# Patient Record
Sex: FEMALE
[De-identification: names, ages, dates, MRNs, and addresses within clinical notes are randomized per-mention and may not be internally consistent; named-entity substitution may affect disease eponyms.]

---

## 2021-01-26 ENCOUNTER — APPOINTMENT (OUTPATIENT)
Dept: HUMAN REPRODUCTION | Facility: CLINIC | Age: 31
End: 2021-01-26

## 2021-02-25 ENCOUNTER — APPOINTMENT (OUTPATIENT)
Dept: HUMAN REPRODUCTION | Facility: CLINIC | Age: 31
End: 2021-02-25
Payer: SELF-PAY

## 2021-02-25 PROCEDURE — 99205 OFFICE O/P NEW HI 60 MIN: CPT | Mod: 95

## 2021-03-18 ENCOUNTER — APPOINTMENT (OUTPATIENT)
Dept: HUMAN REPRODUCTION | Facility: CLINIC | Age: 31
End: 2021-03-18

## 2021-04-20 PROBLEM — Z00.00 ENCOUNTER FOR PREVENTIVE HEALTH EXAMINATION: Status: ACTIVE | Noted: 2021-04-20

## 2021-04-21 ENCOUNTER — APPOINTMENT (OUTPATIENT)
Dept: MATERNAL FETAL MEDICINE | Facility: CLINIC | Age: 31
End: 2021-04-21

## 2021-04-21 ENCOUNTER — TRANSCRIPTION ENCOUNTER (OUTPATIENT)
Age: 31
End: 2021-04-21

## 2021-04-28 ENCOUNTER — APPOINTMENT (OUTPATIENT)
Dept: MATERNAL FETAL MEDICINE | Facility: CLINIC | Age: 31
End: 2021-04-28
Payer: MEDICAID

## 2021-04-28 VITALS — WEIGHT: 165 LBS | BODY MASS INDEX: 29.23 KG/M2 | HEIGHT: 63 IN

## 2021-04-28 DIAGNOSIS — F41.9 ANXIETY DISORDER, UNSPECIFIED: ICD-10-CM

## 2021-04-28 DIAGNOSIS — Z56.0 UNEMPLOYMENT, UNSPECIFIED: ICD-10-CM

## 2021-04-28 DIAGNOSIS — Z78.9 OTHER SPECIFIED HEALTH STATUS: ICD-10-CM

## 2021-04-28 DIAGNOSIS — J45.20 MILD INTERMITTENT ASTHMA, UNCOMPLICATED: ICD-10-CM

## 2021-04-28 DIAGNOSIS — D68.0 VON WILLEBRAND'S DISEASE: ICD-10-CM

## 2021-04-28 DIAGNOSIS — K42.9 UMBILICAL HERNIA W/OUT OBSTRUCTION OR GANGRENE: ICD-10-CM

## 2021-04-28 DIAGNOSIS — Z31.69 ENCOUNTER FOR OTHER GENERAL COUNSELING AND ADVICE ON PROCREATION: ICD-10-CM

## 2021-04-28 DIAGNOSIS — Z80.0 FAMILY HISTORY OF MALIGNANT NEOPLASM OF DIGESTIVE ORGANS: ICD-10-CM

## 2021-04-28 DIAGNOSIS — K80.20 CALCULUS OF GALLBLADDER W/OUT CHOLECYSTITIS W/OUT OBSTRUCTION: ICD-10-CM

## 2021-04-28 DIAGNOSIS — F32.9 ANXIETY DISORDER, UNSPECIFIED: ICD-10-CM

## 2021-04-28 DIAGNOSIS — Z72.3 LACK OF PHYSICAL EXERCISE: ICD-10-CM

## 2021-04-28 DIAGNOSIS — O20.9 HEMORRHAGE IN EARLY PREGNANCY, UNSPECIFIED: ICD-10-CM

## 2021-04-28 PROCEDURE — 99215 OFFICE O/P EST HI 40 MIN: CPT | Mod: 95

## 2021-04-28 SDOH — ECONOMIC STABILITY - INCOME SECURITY: UNEMPLOYMENT, UNSPECIFIED: Z56.0

## 2021-04-28 NOTE — PAST MEDICAL HISTORY
[HIV Infection] : no HIV [Exposure To Gonorrhea] : no gonorrhea [Chlamydial Infections] : no chlamydia [Syphilis] : no syphilis [Herpes Simplex] : no genital herpes [Hepatitis, B Virus] : no Hepatitis B [Hepatitis, C Virus] : no Hepatitis C

## 2021-04-29 NOTE — REVIEW OF SYSTEMS
[Abdominal Pain] : abdominal pain [Chest Pain] : no chest pain [Dyspnea] : no shortness of breath [Pelvic Pain] : no pelvic pain [Abn Vag Bleeding] : no abnormal vaginal bleeding [Easy Bleeding] : easy bleeding [Easy Bruising] : easy bruising

## 2021-04-29 NOTE — HISTORY OF PRESENT ILLNESS
[FreeTextEntry1] : Thank you for referring Ms. Yareli Richmond for Maternal Fetal Medicine preconception consultation. She is a 31 year old  with reported von Willebrand disease who presents today for discussion of risks of pregnancy as she is considering surrogacy with her friend serving as a gestational carrier for her and her . She is amenorrheic from Depo Provera and desires pregnancy in the near future.\par \par Yareli's friend Carolina Navarro is present today for the consultation with her for discussion of risks of pregnancy for both herself and Carolina Navarro. I explained that I would only be able to perform one consultation today and Carolina Navarro is welcome to book a separate consultation for discussion of risks if she elects to be a gestational carrier for Yareli. \par \par Yareli reports a "traumatic last delivery" after being on bedrest for an extended amount of time in her pregnancy and being "told that she would die" at time of delivery if she went into an OR for a . She reports that the doctors and nurses told her that if she "ever showed back up pregnant, it would be a death sentence". Because of this, her friend Carolina Navarro offered to act as a gestational carrier. Of note, Carolina Navarro has a history of two prior  deliveries and her BMI is 42 kg/m^2. \par \par vWD was diagnosed when she was 14 years old because she had many bruises on her legs and arms. She has not seen a Hematologist in many years (since maybe 2017). She does not remember her levels but does recall that she has Type 1 disease. She knows she received DDAVP prior to her deliveries and was told that she could not have an epidural. \par \par Her 's family history is negative for birth defects or genetic disorders to her knowledge. She reports that autism runs in his family. \par \par \par PCP Dr. Jackeline Miller  [Patient travelled to an area with active Zika Virus transmission in the last 6 months] : Patient is trying to get pregnant and she did not travel to an area with active Zika virus transmission in the last 6 months. [Patient's partner travelled to an area with active Zika Virus transmission in the last 6 months] : Patient's partner did not travel to an area with active Zika Virus transmission in the last 6 months

## 2021-04-29 NOTE — SURGICAL HISTORY
[Abn Paps] : abnormal pap smear [OC Use] : OC use [Last Pap: ___] : Last Pap: [unfilled] [Last Mammo: ___] : Last Mammo: [unfilled] [Fibroids] : no fibroids [Breast Disease] : no breast disease [STI's] : no STI's [Infertility] : no infertility [Cysts] : no cysts [de-identified] : Abnormal pap in 2019, has not followed up.

## 2021-04-29 NOTE — ACTIVE PROBLEMS
[Diabetes Mellitus] : no diabetes mellitus [Hypertension] : no hypertension [Heart Disease] : no heart disease [Autoimmune Disease] : no autoimmune disease [Renal Disease] : no kidney disease, no UTI [Neurologic Disorder] : no neurologic disorder, no epilepsy [Hepatic Disorder] : no hepatitis, no liver disease [Thyroid Disorder] : no thyroid dysfunction [FreeTextEntry1] : Asthma diagnosed in childhood, triggered by cold and exercise, last attack in January 2021, uses albuterol inhaler every few months, no night time awakenings, no hospitalizations. \par \par Anxiety and depression diagnosed in 2014, sees a counselor via telemedicine weekly, no psychiatrist, feels that it is helpful. She feels her depression is secondary to her interruption of pregnancy (she did not want this for herself). She also reports issues with her partner's family. She is currently in a stable relationship with her partner and does not communicate with her partner's family. \par \par Yareli has had a blood transfusion in the past and has no objection to transfusion of blood products in the future. \par \par

## 2021-04-29 NOTE — DISCUSSION/SUMMARY
[FreeTextEntry1] : Her problems and my suggestions for her management are summarized below.  She was extensively counseled regarding the following issues:\par \par \par von Willebrand Disease in Pregnancy:\par \par \par Ms. Richmond has not seen a Hematologist in years and there are no labs or records from her prior deliveries available for my review today, and so I suggested that she have baseline labs performed and establish care with a Hematologist. I gave her information to make an appointment with a Hematologist in our system and instructions and requisitions to have labs performed. \par \par Type 1 Von Willebrand’s disease is a quantitative deficiency of VWF that can cause bleeding complications. Treatment is not needed for the majority of women with Von Willebrand’s Disease (VW) during pregnancy. Levels of VWF rise in normal individuals and in most patients with VWD during the second and third trimesters of pregnancy to two to three times baseline.  Most patients with VWD reach normal levels of both VWF and factor VIII at term. \par \par Obstetrical procedures such as amniocentesis, chorionic villus sampling, and regional anesthesia are safe if levels of VWF and factor VIII are maintained at 50% or higher and the coagulation factors are otherwise normal. \par \par Anesthesia consultation can be considered to discuss options for regional anesthesia and analgesia. I discussed that repeat Factor VIII and vWF activity assays in the third trimester will be necessary to prepare for delivery planning.\par \par Since DDAVP has a theoretical possibility of initiating uterine contractions, it is not generally used prior to time of delivery. DDAVP, vWF concentrates or antifibrinolytic therapy are safe to treat bleeding or for operative procedures if necessary. Factor VIII levels should be maintained at or above 50% for a  section in patients with VWD, because low factor VIII appears to be the most important determinant of increased bleeding during delivery. Levels of vWF and factor VIII be maintained at 50 IU/dL or higher during delivery and for at least three to five days after delivery. Plasma levels of factor VIII and vWF can fall quickly after delivery and there is an increased risk of bleeding during this time period. Excessive bleeding may occur up to 21 or more days following delivery and should be treated with DDAVP or VWF concentrates as needed.\par \par Type I vWD, the most common variety, and accounts for 80% of cases. It is usually inherited in an autosomal dominant fashion. Genetic counseling should be considered. \par \par  delivery can be reserved for the usual obstetrical indications, as fetal intraventricular hemorrhage due to maternal or fetal VWD is extremely rare and bleeding risks outweigh these potential risks. Forcep or vacuum delivery should be avoided and pediatrics should be informed at time of delivery as neonates born to mothers with vWD should be tested to determine their vWF status.\par \par I reviewed this information with Ms. Richmond and explained that clearer guidance could be given to her once labs were obtained. \par \par \par This consultation was performed via telehealth using the [employer] video chat with real-time 2-way audio visual technology. Ms. Richmond provided verbal consent to use the application at the time of consultation. She was physically present in her home and I was physically present off site. Her friend Carolina Navarro was present for and participated in the consultation at her request. At the end of our visit, the patient indicated that her questions were answered, and she seemed satisfied with our discussion. 62 minutes were spent with the patient on video chat with an additional 10 minutes spent for documentation and coordination of care.  Please do not hesitate to contact me with any questions.